# Patient Record
Sex: MALE | Race: WHITE | NOT HISPANIC OR LATINO | Employment: FULL TIME | URBAN - METROPOLITAN AREA
[De-identification: names, ages, dates, MRNs, and addresses within clinical notes are randomized per-mention and may not be internally consistent; named-entity substitution may affect disease eponyms.]

---

## 2021-03-03 ENCOUNTER — IMMUNIZATION (OUTPATIENT)
Dept: PRIMARY CARE CLINIC | Facility: CLINIC | Age: 56
End: 2021-03-03

## 2021-03-03 DIAGNOSIS — Z23 NEED FOR VACCINATION: Primary | ICD-10-CM

## 2021-03-03 PROCEDURE — 0001A PR IMMUNIZ ADMIN, SARS-COV-2 COVID-19 VACC, 30MCG/0.3ML, 1ST DOSE: CPT | Mod: CV19,S$GLB,, | Performed by: INTERNAL MEDICINE

## 2021-03-03 PROCEDURE — 91300 PR SARS-COV- 2 COVID-19 VACCINE, NO PRSV, 30MCG/0.3ML, IM: ICD-10-PCS | Mod: S$GLB,,, | Performed by: INTERNAL MEDICINE

## 2021-03-03 PROCEDURE — 0001A PR IMMUNIZ ADMIN, SARS-COV-2 COVID-19 VACC, 30MCG/0.3ML, 1ST DOSE: ICD-10-PCS | Mod: CV19,S$GLB,, | Performed by: INTERNAL MEDICINE

## 2021-03-03 PROCEDURE — 91300 PR SARS-COV- 2 COVID-19 VACCINE, NO PRSV, 30MCG/0.3ML, IM: CPT | Mod: S$GLB,,, | Performed by: INTERNAL MEDICINE

## 2021-03-03 RX ADMIN — Medication 0.3 ML: at 11:03

## 2021-03-26 ENCOUNTER — IMMUNIZATION (OUTPATIENT)
Dept: PRIMARY CARE CLINIC | Facility: CLINIC | Age: 56
End: 2021-03-26

## 2021-03-26 DIAGNOSIS — Z23 NEED FOR VACCINATION: Primary | ICD-10-CM

## 2021-03-26 PROCEDURE — 91300 PR SARS-COV- 2 COVID-19 VACCINE, NO PRSV, 30MCG/0.3ML, IM: CPT | Mod: S$GLB,,, | Performed by: INTERNAL MEDICINE

## 2021-03-26 PROCEDURE — 0002A PR IMMUNIZ ADMIN, SARS-COV-2 COVID-19 VACC, 30MCG/0.3ML, 2ND DOSE: ICD-10-PCS | Mod: CV19,S$GLB,, | Performed by: INTERNAL MEDICINE

## 2021-03-26 PROCEDURE — 0002A PR IMMUNIZ ADMIN, SARS-COV-2 COVID-19 VACC, 30MCG/0.3ML, 2ND DOSE: CPT | Mod: CV19,S$GLB,, | Performed by: INTERNAL MEDICINE

## 2021-03-26 PROCEDURE — 91300 PR SARS-COV- 2 COVID-19 VACCINE, NO PRSV, 30MCG/0.3ML, IM: ICD-10-PCS | Mod: S$GLB,,, | Performed by: INTERNAL MEDICINE

## 2021-03-26 RX ADMIN — Medication 0.3 ML: at 10:03

## 2022-08-11 ENCOUNTER — OFFICE VISIT (OUTPATIENT)
Dept: URGENT CARE | Facility: CLINIC | Age: 57
End: 2022-08-11

## 2022-08-11 VITALS
WEIGHT: 150 LBS | TEMPERATURE: 98 F | BODY MASS INDEX: 23.54 KG/M2 | OXYGEN SATURATION: 98 % | DIASTOLIC BLOOD PRESSURE: 96 MMHG | RESPIRATION RATE: 20 BRPM | HEIGHT: 67 IN | HEART RATE: 93 BPM | SYSTOLIC BLOOD PRESSURE: 176 MMHG

## 2022-08-11 DIAGNOSIS — K40.90 NON-RECURRENT UNILATERAL INGUINAL HERNIA WITHOUT OBSTRUCTION OR GANGRENE: Primary | ICD-10-CM

## 2022-08-11 DIAGNOSIS — F17.200 NEEDS SMOKING CESSATION EDUCATION: ICD-10-CM

## 2022-08-11 PROCEDURE — 99499 NO LOS: ICD-10-PCS | Mod: S$GLB,,,

## 2022-08-11 PROCEDURE — 99499 UNLISTED E&M SERVICE: CPT | Mod: S$GLB,,,

## 2022-08-11 NOTE — PROGRESS NOTES
"Subjective:       Patient ID: Giovanny Nunez is a 56 y.o. male.    Vitals:  height is 5' 7" (1.702 m) and weight is 68 kg (150 lb). His temperature is 98.2 °F (36.8 °C). His blood pressure is 176/96 (abnormal) and his pulse is 93. His respiration is 20 and oxygen saturation is 98%.     Chief Complaint: Groin Pain    57 yo male with complaints of left sided groin pain for 3 months. 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 He states the burning pain resolved but he still has a lump on the left inguinal region. He denies testicular pain, fever, chills, nausea, hematuria.     Groin Pain  The patient's pertinent negatives include no genital injury, genital itching, penile discharge, scrotal swelling or testicular pain. This is a new problem. The current episode started more than 1 month ago. The problem occurs constantly. The patient is experiencing no pain. Pertinent negatives include no chills, constipation, coughing, fever, flank pain, frequency, headaches, hematuria, painful intercourse, rash or sore throat. Exacerbated by: cough. He has tried nothing for the symptoms. He is not sexually active. It is unknown whether or not his partner has an STD.       Constitution: Negative for chills and fever.   HENT: Negative for sore throat.    Respiratory: Negative for cough.    Gastrointestinal: Negative for constipation.   Genitourinary: Negative for frequency, flank pain, hematuria, painful intercourse, penile discharge, painful ejaculation, scrotal swelling and testicular pain.        Mass in groin with groin pain   Skin: Negative for rash.   Neurological: Negative for headaches.       Objective:      Physical Exam   Constitutional: He is oriented to person, place, and time. He appears well-developed. No distress.   HENT:   Head: Normocephalic and atraumatic.   Ears:   Right Ear: External ear normal.   Left Ear: External ear " normal.   Nose: Nose normal. No nasal deformity. No epistaxis.   Mouth/Throat: Oropharynx is clear and moist and mucous membranes are normal.   Eyes: Conjunctivae and lids are normal.   Neck: Trachea normal and phonation normal. Neck supple.   Cardiovascular: Normal rate, regular rhythm and normal heart sounds.   Pulmonary/Chest: Effort normal and breath sounds normal.   Abdominal: Normal appearance and bowel sounds are normal. He exhibits no distension. Soft. There is no abdominal tenderness. A hernia is present. Hernia confirmed positive in the left inguinal area.   Genitourinary:    Testes and penis normal.   Cremasteric reflex is present. Right testis shows no mass. Left testis shows no mass. Right epididymis is/has Normal. Right epididymis is/has no mass and no tenderness. Left epididymis is/has normal. Left epididymis is/has no mass and no tenderness. No phimosis, paraphimosis, penile erythema or penile tenderness.         Comments: Bulging mass to the left inguinal canal. Positive hernia felt on physical exam of the left inguinal canal.      Musculoskeletal: Normal range of motion.         General: Normal range of motion.   Neurological: He is alert and oriented to person, place, and time. He has normal reflexes.   Skin: Skin is warm, dry, intact and not diaphoretic.   Psychiatric: His speech is normal and behavior is normal. Judgment and thought content normal.   Nursing note and vitals reviewed.chaperone present           Assessment:       1. Non-recurrent unilateral inguinal hernia without obstruction or gangrene          Plan:       Patient does not have insurance. I advised him to go to [facility] for further evaluation of inguinal hernia to rule out strangulated hernia. Patient had no testicular pain or tenderness. No testicular mass. Cremasteric present. Unlikely that patient has testicular torsion given physical exam findings but needs an ultrasound of the inguinal hernia and referral to general surgery.  Patient was instructed to go to [facility] for treatment and evaluation. He was advised to have them set him up with medicaid and needs gen surgery referral. Patient verbalized. Low suspicion for strangulated hernia given patient has had this hernia for 3 months. He was going to [facility] ER today via self transport. Patient stable and in no acute distress.     Non-recurrent unilateral inguinal hernia without obstruction or gangrene